# Patient Record
Sex: FEMALE | Race: WHITE | NOT HISPANIC OR LATINO | URBAN - METROPOLITAN AREA
[De-identification: names, ages, dates, MRNs, and addresses within clinical notes are randomized per-mention and may not be internally consistent; named-entity substitution may affect disease eponyms.]

---

## 2017-08-14 ENCOUNTER — IMPORTED ENCOUNTER (OUTPATIENT)
Dept: URBAN - METROPOLITAN AREA CLINIC 50 | Facility: CLINIC | Age: 82
End: 2017-08-14

## 2017-08-21 ENCOUNTER — IMPORTED ENCOUNTER (OUTPATIENT)
Dept: URBAN - METROPOLITAN AREA CLINIC 50 | Facility: CLINIC | Age: 82
End: 2017-08-21

## 2017-09-15 ENCOUNTER — IMPORTED ENCOUNTER (OUTPATIENT)
Dept: URBAN - METROPOLITAN AREA CLINIC 50 | Facility: CLINIC | Age: 82
End: 2017-09-15

## 2017-10-05 ENCOUNTER — IMPORTED ENCOUNTER (OUTPATIENT)
Dept: URBAN - METROPOLITAN AREA CLINIC 50 | Facility: CLINIC | Age: 82
End: 2017-10-05

## 2017-10-18 ENCOUNTER — IMPORTED ENCOUNTER (OUTPATIENT)
Dept: URBAN - METROPOLITAN AREA CLINIC 50 | Facility: CLINIC | Age: 82
End: 2017-10-18

## 2017-11-20 ENCOUNTER — IMPORTED ENCOUNTER (OUTPATIENT)
Dept: URBAN - METROPOLITAN AREA CLINIC 50 | Facility: CLINIC | Age: 82
End: 2017-11-20

## 2018-04-02 ENCOUNTER — IMPORTED ENCOUNTER (OUTPATIENT)
Dept: URBAN - METROPOLITAN AREA CLINIC 50 | Facility: CLINIC | Age: 83
End: 2018-04-02

## 2018-04-02 NOTE — PATIENT DISCUSSION
"""Continue Timolol 0.5% GFS left eye in the morning. "" ""Continue Latanoprost both eyes at bedtime. """

## 2018-04-30 ENCOUNTER — IMPORTED ENCOUNTER (OUTPATIENT)
Dept: URBAN - METROPOLITAN AREA CLINIC 50 | Facility: CLINIC | Age: 83
End: 2018-04-30

## 2018-06-18 ENCOUNTER — IMPORTED ENCOUNTER (OUTPATIENT)
Dept: URBAN - METROPOLITAN AREA CLINIC 50 | Facility: CLINIC | Age: 83
End: 2018-06-18

## 2018-10-01 ENCOUNTER — IMPORTED ENCOUNTER (OUTPATIENT)
Dept: URBAN - METROPOLITAN AREA CLINIC 50 | Facility: CLINIC | Age: 83
End: 2018-10-01

## 2021-04-16 ASSESSMENT — PACHYMETRY
OD_CT_UM: 552
OD_CT_UM: 552
OS_CT_UM: 546
OD_CT_UM: 552
OD_CT_UM: 552
OS_CT_UM: 546
OD_CT_UM: 552
OD_CT_UM: 552
OS_CT_UM: 546
OD_CT_UM: 552
OS_CT_UM: 546
OD_CT_UM: 552
OS_CT_UM: 546
OS_CT_UM: 546
OD_CT_UM: 552
OS_CT_UM: 546
OD_CT_UM: 552
OS_CT_UM: 546

## 2021-04-16 ASSESSMENT — VISUAL ACUITY
OS_CC: 20/40-2
OD_CC: 20/400
OD_CC: 20/30
OD_PH: 20/100
OS_PH: 20/60-1
OD_CC: 20/400
OS_CC: 20/40
OS_CC: 20/30-1
OD_CC: 20/400
OS_CC: 20/50+1
OD_CC: 20/80-1
OD_PH: 20/50-1
OS_PH: 20/40+
OS_CC: 20/300
OS_CC: 20/200
OS_CC: 20/100
OD_CC: 20/80-2
OD_CC: 20/80-2

## 2021-04-16 ASSESSMENT — TONOMETRY
OD_IOP_MMHG: 15
OD_IOP_MMHG: 14
OS_IOP_MMHG: 20
OS_IOP_MMHG: 14
OS_IOP_MMHG: 15
OS_IOP_MMHG: 15
OS_IOP_MMHG: 14
OD_IOP_MMHG: 20
OD_IOP_MMHG: 12
OD_IOP_MMHG: 15
OD_IOP_MMHG: 16
OS_IOP_MMHG: 14

## 2024-01-24 NOTE — PATIENT DISCUSSION
Cataract symptoms i.e., glare, blur discussed. Pt to call if worsening vision or trouble with driving, TV, reading, ADL. UV precautions. Reviewed possibility of future cataract surgery. on the discharge service for the patient. I have reviewed and made amendments to the documentation where necessary.